# Patient Record
Sex: MALE | Race: BLACK OR AFRICAN AMERICAN | Employment: PART TIME | URBAN - METROPOLITAN AREA
[De-identification: names, ages, dates, MRNs, and addresses within clinical notes are randomized per-mention and may not be internally consistent; named-entity substitution may affect disease eponyms.]

---
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
## 2022-01-10 ENCOUNTER — HOSPITAL ENCOUNTER (EMERGENCY)
Age: 22
Discharge: HOME | End: 2022-01-10
Attending: EMERGENCY MEDICINE
Payer: COMMERCIAL

## 2022-01-10 ENCOUNTER — APPOINTMENT (OUTPATIENT)
Dept: GENERAL RADIOLOGY | Age: 22
End: 2022-01-10
Attending: NURSE PRACTITIONER
Payer: COMMERCIAL

## 2022-01-10 VITALS
OXYGEN SATURATION: 93 % | HEART RATE: 96 BPM | RESPIRATION RATE: 18 BRPM | DIASTOLIC BLOOD PRESSURE: 79 MMHG | HEIGHT: 73 IN | SYSTOLIC BLOOD PRESSURE: 120 MMHG | TEMPERATURE: 99.5 F | BODY MASS INDEX: 29.82 KG/M2 | WEIGHT: 225 LBS

## 2022-01-10 DIAGNOSIS — J12.9 VIRAL PNEUMONIA: ICD-10-CM

## 2022-01-10 DIAGNOSIS — U07.1 COVID-19: Primary | ICD-10-CM

## 2022-01-10 PROCEDURE — 71045 X-RAY EXAM CHEST 1 VIEW: CPT

## 2022-01-10 PROCEDURE — 99281 EMR DPT VST MAYX REQ PHY/QHP: CPT

## 2022-01-10 RX ORDER — CODEINE PHOSPHATE AND GUAIFENESIN 10; 100 MG/5ML; MG/5ML
10 SOLUTION ORAL
Qty: 60 ML | Refills: 0 | Status: SHIPPED | OUTPATIENT
Start: 2022-01-10 | End: 2022-01-15

## 2022-01-10 NOTE — DISCHARGE INSTRUCTIONS
Continue to use your pulse oximeter as you have been. Continue to take the doxycycline. Continue to take Tylenol and Motrin and hopefully your fever starts to break within a few days. I prescribe some stronger cough medicine for you as well that is generally best taken in the evening before you go to bed.

## 2022-01-10 NOTE — Clinical Note
[facility]  [facility] EMERGENCY DEPT  [id number]  [phone]    Work/School Note    Date: 1/10/2022     To Whom It May concern:    Selena Chew III was evaluated by the following provider(s):  Attending Provider: Gato Quinones MD  Nurse Practitioner: Kajal Lindsey NP.   [place] virus is suspected. Per the CDC guidelines we recommend home isolation until the following conditions are all met:    1. At least five days have passed since symptoms first appeared and/or had a close exposure,   2. After home isolation for five days, wearing a mask around others for the next five days,  3. At least 24 have passed since last fever without the use of fever-reducing medications and  4.  Symptoms (eg cough, shortness of breath) have improved      Sincerely,          Larisa Wood NP

## 2022-01-10 NOTE — ED PROVIDER NOTES
41-year-old male with no medical history presents the ER today for evaluation of fever, cough, and mild shortness of breath after testing positive for COVID-19 5 days ago (currently on symptomatic day 7). Patient was seen at [facility] 2 days ago and was told that he had pneumonia and started taking doxycycline and antitussives yesterday which do not seem to be helping. He has been rotating Tylenol and Motrin but continues to run a fever of at least 101 °F    He denies any significant dyspnea, chest pain, dizziness. No past medical history on file. No past surgical history on file. No family history on file. Social History     Socioeconomic History    Marital status: SINGLE     Spouse name: Not on file    Number of children: Not on file    Years of education: Not on file    Highest education level: Not on file   Occupational History    Not on file   Tobacco Use    Smoking status: Not on file    Smokeless tobacco: Not on file   Substance and Sexual Activity    Alcohol use: Not on file    Drug use: Not on file    Sexual activity: Not on file   Other Topics Concern    Not on file   Social History Narrative    Not on file     Social Determinants of Health     Financial Resource Strain:     Difficulty of Paying Living Expenses: Not on file   Food Insecurity:     Worried About Running Out of Food in the Last Year: Not on file    Scarlet of Food in the Last Year: Not on file   Transportation Needs:     Lack of Transportation (Medical): Not on file    Lack of Transportation (Non-Medical):  Not on file   Physical Activity:     Days of Exercise per Week: Not on file    Minutes of Exercise per Session: Not on file   Stress:     Feeling of Stress : Not on file   Social Connections:     Frequency of Communication with Friends and Family: Not on file    Frequency of Social Gatherings with Friends and Family: Not on file    Attends Druze Services: Not on file   [place] Active Member of Clubs or Organizations: Not on file    Attends Club or Organization Meetings: Not on file    Marital Status: Not on file   Intimate Partner Violence:     Fear of Current or Ex-Partner: Not on file    Emotionally Abused: Not on file    Physically Abused: Not on file    Sexually Abused: Not on file   Housing Stability:     Unable to Pay for Housing in the Last Year: Not on file    Number of [place] in the Last Year: Not on file    Unstable Housing in the Last Year: Not on file         ALLERGIES: Patient has no known allergies. Review of Systems   Constitutional: Positive for chills, fatigue and fever. HENT: Negative for sore throat. Eyes: Negative for visual disturbance. Respiratory: Positive for cough and shortness of breath. Cardiovascular: Negative for palpitations. Gastrointestinal: Negative for vomiting. Genitourinary: Negative for dysuria. Musculoskeletal: Negative for myalgias. Skin: Negative for rash. Neurological: Negative for dizziness. Psychiatric/Behavioral: Negative for dysphoric mood. Vitals:    01/10/22 1223 01/10/22 1244   BP:  120/79   Pulse: 72 96   Resp:  18   Temp:  99.5 °F (37.5 °C)   SpO2: 94% 93%   Weight:  102.1 kg (225 lb)   Height:  6' 1\" (1.854 m)            Physical Exam  Vitals and nursing note reviewed. Constitutional:       General: He is not in acute distress. Appearance: Normal appearance. He is not ill-appearing. HENT:      Head: Normocephalic and atraumatic. Nose: Nose normal.      Mouth/Throat:      Mouth: Mucous membranes are moist.      Pharynx: Oropharynx is clear. Eyes:      Extraocular Movements: Extraocular movements intact. Cardiovascular:      Rate and Rhythm: Normal rate and regular rhythm. Pulses: Normal pulses. Heart sounds: Normal heart sounds. No murmur heard. No friction rub. Pulmonary:      Effort: Tachypnea present.       Breath sounds: Examination of the left-lower field reveals rales. Rales present. Abdominal:      General: Bowel sounds are normal.      Palpations: Abdomen is soft. Musculoskeletal:         General: Normal range of motion. Cervical back: Normal range of motion and neck supple. Skin:     General: Skin is warm and dry. Neurological:      Mental Status: He is alert and oriented to person, place, and time. Mental status is at baseline. Psychiatric:         Mood and Affect: Mood normal.         Behavior: Behavior normal.          MDM      VITAL SIGNS:  Patient Vitals for the past 4 hrs:   Temp Pulse Resp BP SpO2   01/10/22 1244 99.5 °F (37.5 °C) 96 18 120/79 93 %   01/10/22 1223  72   94 %         LABS:  No results found for this or any previous visit (from the past 6 hour(s)). IMAGING:  XR CHEST PORT   Final Result   Multilobar pneumonia            Medications During Visit:  Medications - No data to display      DECISION MAKING:  Jory Khan III is a 24 y.o. male who comes in as above. X-ray consistent with COVID-19 pneumonia. Patient is not hypoxic and has been checking his SPO2 at home regularly with a pulse oximeter. He is instructed to continue taking the doxycycline he recently started and continue antitussives and antipyretics as needed. The clinical decision making for this encounter included ordering and interpreting the above diagnostic tests with comparison to prior studies that are within our EMR. Past medical and surgical histories were reviewed, as were records from recent outpatient and emergency department visits. The above results discussed and reviewed with the patient. Patient verbalized understanding of the care plan, including any changes to current outpatient medication regimen, discussed disease process, symptom control, and follow-up care. Return precautions reviewed. IMPRESSION:  1. COVID-19    2.  Viral pneumonia        DISPOSITION:  Discharged      Current Discharge Medication List      START taking these medications    Details   guaiFENesin-codeine (ROBITUSSIN AC) 100-10 mg/5 mL solution Take 10 mL by mouth two (2) times daily as needed for Cough for up to 5 days. Max Daily Amount: 20 mL. Qty: 60 mL, Refills: 0  Start date: 1/10/2022, End date: 1/15/2022    Associated Diagnoses: COVID-19; Viral pneumonia              Follow-up Information     Follow up With Specialties Details Why Contact Info    Santhosh Martinez MD Pediatric Medicine Schedule an appointment as soon as possible for a visit  As needed [id number] [facility] [id number] [phone]              The patient is asked to follow-up with their primary care provider in the next several days. They are to call tomorrow for an appointment. The patient is asked to return promptly for any increased concerns or worsening of symptoms. They can return to this emergency department or any other emergency department.       Procedures

## 2022-01-10 NOTE — ED TRIAGE NOTES
Pt reports he tested positive for covid about 5 days ago. Seen at [address] [place] [address] a couple days ago and dx with pneumonia. Continues to have fevers and cough. Taking medication for fever without relief. Last dose at about 1000. Not vaccinated.